# Patient Record
Sex: FEMALE | Race: WHITE | Employment: UNEMPLOYED
[De-identification: names, ages, dates, MRNs, and addresses within clinical notes are randomized per-mention and may not be internally consistent; named-entity substitution may affect disease eponyms.]

---

## 2017-06-03 ENCOUNTER — HEALTH MAINTENANCE LETTER (OUTPATIENT)
Age: 62
End: 2017-06-03

## 2017-11-25 ENCOUNTER — HEALTH MAINTENANCE LETTER (OUTPATIENT)
Age: 62
End: 2017-11-25

## 2018-11-02 ENCOUNTER — OFFICE VISIT (OUTPATIENT)
Dept: OPHTHALMOLOGY | Facility: CLINIC | Age: 63
End: 2018-11-02
Payer: COMMERCIAL

## 2018-11-02 DIAGNOSIS — H52.4 PRESBYOPIA: ICD-10-CM

## 2018-11-02 DIAGNOSIS — H25.13 NUCLEAR SCLEROSIS OF BOTH EYES: Primary | ICD-10-CM

## 2018-11-02 PROCEDURE — 92004 COMPRE OPH EXAM NEW PT 1/>: CPT | Performed by: STUDENT IN AN ORGANIZED HEALTH CARE EDUCATION/TRAINING PROGRAM

## 2018-11-02 PROCEDURE — 92015 DETERMINE REFRACTIVE STATE: CPT | Performed by: STUDENT IN AN ORGANIZED HEALTH CARE EDUCATION/TRAINING PROGRAM

## 2018-11-02 ASSESSMENT — VISUAL ACUITY
OD_CC: J1
METHOD: SNELLEN - LINEAR
OS_CC: J1
OS_CC: 20/20
CORRECTION_TYPE: GLASSES
OD_CC: 20/25

## 2018-11-02 ASSESSMENT — REFRACTION_WEARINGRX
OS_CYLINDER: +0.75
SPECS_TYPE: PAL
OD_ADD: +2.50
OD_AXIS: 026
OD_SPHERE: -2.50
OS_SPHERE: -3.25
OS_ADD: +2.50
OS_AXIS: 015
OD_CYLINDER: +0.50

## 2018-11-02 ASSESSMENT — TONOMETRY
OS_IOP_MMHG: 19
IOP_METHOD: APPLANATION
OD_IOP_MMHG: 19

## 2018-11-02 ASSESSMENT — REFRACTION_MANIFEST
OD_CYLINDER: +0.25
OS_SPHERE: -3.25
OS_AXIS: 007
OD_ADD: +2.75
OS_CYLINDER: +0.75
OD_SPHERE: -3.00
OD_AXIS: 010
OS_ADD: +2.75

## 2018-11-02 ASSESSMENT — CUP TO DISC RATIO
OD_RATIO: 0.2
OS_RATIO: 0.2

## 2018-11-02 ASSESSMENT — EXTERNAL EXAM - LEFT EYE: OS_EXAM: NORMAL

## 2018-11-02 ASSESSMENT — SLIT LAMP EXAM - LIDS
COMMENTS: NORMAL
COMMENTS: NORMAL

## 2018-11-02 ASSESSMENT — CONF VISUAL FIELD
OD_NORMAL: 1
OS_NORMAL: 1

## 2018-11-02 ASSESSMENT — EXTERNAL EXAM - RIGHT EYE: OD_EXAM: NORMAL

## 2018-11-02 NOTE — LETTER
11/2/2018         RE: Teresa Gay  [address] [address]        Dear Dr. Boland,    Thank you for referring your patient, Teresa Gay, to the [facility].     Her eye exam is stable.  Please see a copy of my visit note below.     Current Eye Medications:  no     Subjective:  Comprehensive eye exam.   Patient reports is seeing well, vision seems unchanged and states eyes seem otherwise fine.     No previous eye injuries or surgeries. Has been seen at [facility] for eye exam in the past couple of years.      Objective:  See Ophthalmology Exam.       Assessment:  Teresa Gay is a 63 year old female who presents with:   Encounter Diagnoses   Name Primary?     Nuclear sclerosis of both eyes        Plan:  Glasses prescription given - optional    Shayna Harmon MD  [phone]        Again, thank you for allowing me to participate in the care of your patient.        Sincerely,        Shayna Harmon MD

## 2018-11-02 NOTE — MR AVS SNAPSHOT
After Visit Summary   11/2/2018    Teresa Gay    MRN: [id number]           Patient Information     Date Of Birth          1955        Visit Information        Provider Department      11/2/2018 1:30 PM Shayna Harmon MD [facility]        Today's Diagnoses     Presbyopia    -  1      Care Instructions    Glasses prescription given - optional    Shayna Harmon MD  [phone]            Follow-ups after your visit        Follow-up notes from your care team     Return in about 1 year (around 11/2/2019) for Complete Exam.      Who to contact     If you have questions or need follow up information about today's clinic visit or your schedule please contact [facility] directly at [phone].  Normal or non-critical lab and imaging results will be communicated to you by MyChart, letter or phone within 4 business days after the clinic has received the results. If you do not hear from us within 7 days, please contact the clinic through MyChart or phone. If you have a critical or abnormal lab result, we will notify you by phone as soon as possible.  Submit refill requests through [employer] or call your pharmacy and they will forward the refill request to us. Please allow 3 business days for your refill to be completed.          Additional Information About Your Visit        MyChart Information     [employer] gives you secure access to your electronic health record. If you see a primary care provider, you can also send messages to your care team and make appointments. If you have questions, please call your primary care clinic.  If you do not have a primary care provider, please call [phone] and they will assist you.        Care EveryWhere ID     This is your Care EveryWhere ID. This could be used by other organizations to access your [place] medical records  [id number]         Blood Pressure from Last 3 Encounters:   06/19/12 120/70   06/05/12 128/74   12/30/11  114/74    Weight from Last 3 Encounters:   06/19/12 49.9 kg (110 lb)   06/05/12 49.9 kg (110 lb)   12/30/11 49.2 kg (108 lb 6.4 oz)              We Performed the Following     EYE EXAM (SIMPLE-NONBILLABLE)     REFRACTIVE STATUS        Primary Care Provider Office Phone # Fax #    Ngozi Boland [phone] [address] [facility] [facility] [address]        Equal Access to Services     [facility] Center[facility] : Hadii aad ku hadasho Soomaali, waaxda luqadaha, qaybta kaalmada adeegyada, waxay idiin hayaan adeeg kharash laalondra . So [facility] [phone].    ATENCIÓN: Si habla español, tiene a matute disposición servicios gratuitos de asistencia lingüística. [facility] [phone].    We comply with applicable federal civil rights laws and [place] laws. We do not discriminate on the basis of race, color, national origin, age, disability, sex, sexual orientation, or gender identity.            Thank you!     Thank you for choosing [facility]  for your care. Our goal is always to provide you with excellent care. Hearing back from our patients is one way we can continue to improve our services. Please take a few minutes to complete the written survey that you may receive in the mail after your visit with us. Thank you!             Your Updated Medication List - Protect others around you: Learn how to safely use, store and throw away your medicines at www.disposemymeds.org.          This list is accurate as of 11/2/18  2:27 PM.  Always use your most recent med list.                   Brand Name Dispense Instructions for use Diagnosis    acarbose 25 MG tablet    PRECOSE     Take 50 mg by mouth 3 times daily (with meals).        aspirin 81 MG tablet      Take 1 tablet by mouth daily.        clonazePAM 0.5 MG tablet    klonoPIN     Take 0.5 mg by mouth See Admin Instructions. 0.5mg in a.m. and 1 mg at p.m.        EFFEXOR  MG 24 hr capsule   Generic drug:  venlafaxine      1 CAPSULE TWICE DAILY WITH FOOD         MULTIPLE VITAMIN PO      Take 1 tablet by mouth daily.        omeprazole 40 MG capsule    priLOSEC    180 capsule    Take 1 capsule by mouth 2 times daily. Patient needs to schedule an appointment for further refills.    GERD (gastroesophageal reflux disease)       * polyethylene glycol powder    MIRALAX    2700 g    Mix 15mg and drink 2 times daily    Constipation       * polyethylene glycol powder    MIRALAX    3162 g    Take 17 g by mouth 2 times daily.    Chronic constipation       traZODone 50 MG tablet    DESYREL     Take 1.5 tablets by mouth At Bedtime.        tretinoin 0.05 % cream    RETIN-A    45 g    Apply  topically At Bedtime. Spread a pea size amount into affected area.  Use sunscreen SPF>20.    Acne       vitamin D 1000 units capsule      Take 1 capsule by mouth daily.        WELLBUTRIN  MG 24 hr tablet   Generic drug:  buPROPion      Take 1 tablet by mouth 3 times daily.        * Notice:  This list has 2 medication(s) that are the same as other medications prescribed for you. Read the directions carefully, and ask your doctor or other care provider to review them with you.

## 2018-11-02 NOTE — PROGRESS NOTES
Current Eye Medications:  no     Subjective:  Comprehensive eye exam.   Patient reports is seeing well, vision seems unchanged and states eyes seem otherwise fine.     No previous eye injuries or surgeries. Has been seen at [facility] for eye exam in the past couple of years.      Objective:  See Ophthalmology Exam.       Assessment:  Teresa Gay is a 63 year old female who presents with:   Encounter Diagnoses   Name Primary?     Nuclear sclerosis of both eyes        Plan:  Glasses prescription given - optional    Shayna Harmon MD  [phone]

## 2019-06-17 ENCOUNTER — OFFICE VISIT (OUTPATIENT)
Dept: OPTOMETRY | Facility: CLINIC | Age: 64
End: 2019-06-17
Payer: COMMERCIAL

## 2019-06-17 DIAGNOSIS — H04.123 DRY EYES: ICD-10-CM

## 2019-06-17 DIAGNOSIS — L71.9 BLEPHARITIS OF BOTH EYES WITH ROSACEA: Primary | ICD-10-CM

## 2019-06-17 DIAGNOSIS — H01.006 BLEPHARITIS OF BOTH EYES WITH ROSACEA: Primary | ICD-10-CM

## 2019-06-17 DIAGNOSIS — H11.32 SUBCONJUNCTIVAL HEMORRHAGE, NON-TRAUMATIC, LEFT: ICD-10-CM

## 2019-06-17 DIAGNOSIS — H01.003 BLEPHARITIS OF BOTH EYES WITH ROSACEA: Primary | ICD-10-CM

## 2019-06-17 PROCEDURE — 99213 OFFICE O/P EST LOW 20 MIN: CPT | Performed by: OPTOMETRIST

## 2019-06-17 RX ORDER — BUSPIRONE HYDROCHLORIDE 5 MG/1
5 TABLET ORAL 3 TIMES DAILY
COMMUNITY

## 2019-06-17 ASSESSMENT — CONF VISUAL FIELD
OD_NORMAL: 1
OS_NORMAL: 1

## 2019-06-17 ASSESSMENT — VISUAL ACUITY
OS_CC: 20/25
OS_CC+: -1
METHOD: SNELLEN - LINEAR
CORRECTION_TYPE: GLASSES
OD_CC: 20/20
OD_CC+: -1

## 2019-06-17 NOTE — LETTER
6/17/2019         RE: Teresa Gay  [address]  [place] [address]        Dear Colleague,    Thank you for referring your patient, Teresa Gay, to the [facility]. Please see a copy of my visit note below.    Chief Complaint   Patient presents with     Conjunctivitis       Do you wear contact lenses? No  Eyes: positive for redness, irritation  Constitutional: No fevers, chills, or weight changes.      Latasha Kern, Optometric Tech     See Review Of Systems       Medical, surgical and family histories reviewed and updated 6/17/2019.         OBJECTIVE: See Ophthalmology exam    ASSESSMENT:    ICD-10-CM    1. Blepharitis of both eyes with rosacea H01.003     H01.006     L71.9    2. Dry eyes H04.123    3. Subconjunctival hemorrhage, non-traumatic, left H11.32       PLAN:    Patient Instructions    DRY EYE TREATMENT    I recommend using artificial tears for your dry eye. There are over the counter drops that work well and may be used up to 4x daily. ( systane balance, refresh optive, soothe xp)   If you need more than 4 drops daily, use a preservative free product which come in individual vials which may be used for 24 hours and discarded.     Artificial tears work best as a preventative and not as well after your eyes are starting to bother you.  It may take 4- 6 weeks of using the drops before you notice improvement.  If after that time you are still having problems schedule an appointment for an evaluation and discussion of different treatments.  Dry eyes are a chronic condition and you may have more symptoms at certain times of the year.      Additional recommended treatment:  Warm compresses once to twice daily for 5-10 minutes    Directions for warm soaks  There are few methods for hot compresses. Moisten a washcloth with hot water, or microwave for 10 seconds, being careful to not get the cloth too hot.   Then put the washcloth onto your eyelids for 5 minutes. It will cool quickly so a  rice pack or eyemask that can be heated and laid on top of the washcloth will help retain the heat.       Blepharitis is a chronic or long term inflammation of the eyelids and eyelashes. It affects all ages and may appear as greasy flakes on the base of the eyelashes, crusting of eyelashes and mild redness of the eyelid margins.  Sometimes it may result in an acute infection of a gland in the eyelid called a stye and sometimes painless firm nodules can form in the eyelid.  Overabundance of bacterial microorganisms along the eyelashes and lid margins induce stress on the tear film and promote inflammation.    Treatment includes warm compresses and improved lid hygiene. Regular lid hygiene helps diminish the bacterial population to prevent inflammation and infection.  Eyelid cleansers maintain clean and healthy eyelid margins.  Ocusoft or Sterilid are commercial products that are available as individual wrapped cleansing pads and can be purchased at most pharmacies. Cleanse lids once daily with a lid cleansing product as directed. Diluted baby shampoo can also be safely applied to the eyelids and is another method to improve lid hygiene.     Begin use of warm compresses at least twice daily.   Begin use of artificial tears 2+ times daily in each eye.     Conner Faith O.D.  [address] [place]  [address]    [phone]           Again, thank you for allowing me to participate in the care of your patient.        Sincerely,        Conner Faith, DONNY

## 2019-06-17 NOTE — PROGRESS NOTES
Chief Complaint   Patient presents with     Conjunctivitis       Do you wear contact lenses? No  Eyes: positive for redness, irritation  Constitutional: No fevers, chills, or weight changes.      Latasha Kern, Optometric Tech     See Review Of Systems       Medical, surgical and family histories reviewed and updated 6/17/2019.         OBJECTIVE: See Ophthalmology exam    ASSESSMENT:    ICD-10-CM    1. Blepharitis of both eyes with rosacea H01.003     H01.006     L71.9    2. Dry eyes H04.123    3. Subconjunctival hemorrhage, non-traumatic, left H11.32       PLAN:    Patient Instructions    DRY EYE TREATMENT    I recommend using artificial tears for your dry eye. There are over the counter drops that work well and may be used up to 4x daily. ( systane balance, refresh optive, soothe xp)   If you need more than 4 drops daily, use a preservative free product which come in individual vials which may be used for 24 hours and discarded.     Artificial tears work best as a preventative and not as well after your eyes are starting to bother you.  It may take 4- 6 weeks of using the drops before you notice improvement.  If after that time you are still having problems schedule an appointment for an evaluation and discussion of different treatments.  Dry eyes are a chronic condition and you may have more symptoms at certain times of the year.      Additional recommended treatment:  Warm compresses once to twice daily for 5-10 minutes    Directions for warm soaks  There are few methods for hot compresses. Moisten a washcloth with hot water, or microwave for 10 seconds, being careful to not get the cloth too hot.   Then put the washcloth onto your eyelids for 5 minutes. It will cool quickly so a rice pack or eyemask that can be heated and laid on top of the washcloth will help retain the heat.       Blepharitis is a chronic or long term inflammation of the eyelids and eyelashes. It affects all ages and may appear as greasy  flakes on the base of the eyelashes, crusting of eyelashes and mild redness of the eyelid margins.  Sometimes it may result in an acute infection of a gland in the eyelid called a stye and sometimes painless firm nodules can form in the eyelid.  Overabundance of bacterial microorganisms along the eyelashes and lid margins induce stress on the tear film and promote inflammation.    Treatment includes warm compresses and improved lid hygiene. Regular lid hygiene helps diminish the bacterial population to prevent inflammation and infection.  Eyelid cleansers maintain clean and healthy eyelid margins.  Ocusoft or Sterilid are commercial products that are available as individual wrapped cleansing pads and can be purchased at most pharmacies. Cleanse lids once daily with a lid cleansing product as directed. Diluted baby shampoo can also be safely applied to the eyelids and is another method to improve lid hygiene.     Begin use of warm compresses at least twice daily.   Begin use of artificial tears 2+ times daily in each eye.     Conner Faith O.D.  [facility]  [place]  [address]    [phone]

## 2019-06-17 NOTE — PATIENT INSTRUCTIONS
DRY EYE TREATMENT    I recommend using artificial tears for your dry eye. There are over the counter drops that work well and may be used up to 4x daily. ( systane balance, refresh optive, soothe xp)   If you need more than 4 drops daily, use a preservative free product which come in individual vials which may be used for 24 hours and discarded.     Artificial tears work best as a preventative and not as well after your eyes are starting to bother you.  It may take 4- 6 weeks of using the drops before you notice improvement.  If after that time you are still having problems schedule an appointment for an evaluation and discussion of different treatments.  Dry eyes are a chronic condition and you may have more symptoms at certain times of the year.      Additional recommended treatment:  Warm compresses once to twice daily for 5-10 minutes    Directions for warm soaks  There are few methods for hot compresses. Moisten a washcloth with hot water, or microwave for 10 seconds, being careful to not get the cloth too hot.   Then put the washcloth onto your eyelids for 5 minutes. It will cool quickly so a rice pack or eyemask that can be heated and laid on top of the washcloth will help retain the heat.       Blepharitis is a chronic or long term inflammation of the eyelids and eyelashes. It affects all ages and may appear as greasy flakes on the base of the eyelashes, crusting of eyelashes and mild redness of the eyelid margins.  Sometimes it may result in an acute infection of a gland in the eyelid called a stye and sometimes painless firm nodules can form in the eyelid.  Overabundance of bacterial microorganisms along the eyelashes and lid margins induce stress on the tear film and promote inflammation.    Treatment includes warm compresses and improved lid hygiene. Regular lid hygiene helps diminish the bacterial population to prevent inflammation and infection.  Eyelid cleansers maintain clean and healthy eyelid  margins.  Ocusoft or Sterilid are commercial products that are available as individual wrapped cleansing pads and can be purchased at most pharmacies. Cleanse lids once daily with a lid cleansing product as directed. Diluted baby shampoo can also be safely applied to the eyelids and is another method to improve lid hygiene.     Begin use of warm compresses at least twice daily.   Begin use of artificial tears 2+ times daily in each eye.     Conner Faith O.D.  [address]  [place]  [address]    [phone]

## 2019-09-29 ENCOUNTER — HEALTH MAINTENANCE LETTER (OUTPATIENT)
Age: 64
End: 2019-09-29

## 2021-01-14 ENCOUNTER — HEALTH MAINTENANCE LETTER (OUTPATIENT)
Age: 66
End: 2021-01-14

## 2021-05-09 ENCOUNTER — HEALTH MAINTENANCE LETTER (OUTPATIENT)
Age: 66
End: 2021-05-09

## 2021-10-24 ENCOUNTER — HEALTH MAINTENANCE LETTER (OUTPATIENT)
Age: 66
End: 2021-10-24

## 2022-06-05 ENCOUNTER — HEALTH MAINTENANCE LETTER (OUTPATIENT)
Age: 67
End: 2022-06-05

## 2022-10-15 ENCOUNTER — HEALTH MAINTENANCE LETTER (OUTPATIENT)
Age: 67
End: 2022-10-15

## 2023-06-11 ENCOUNTER — HEALTH MAINTENANCE LETTER (OUTPATIENT)
Age: 68
End: 2023-06-11